# Patient Record
Sex: MALE | Race: BLACK OR AFRICAN AMERICAN | URBAN - METROPOLITAN AREA
[De-identification: names, ages, dates, MRNs, and addresses within clinical notes are randomized per-mention and may not be internally consistent; named-entity substitution may affect disease eponyms.]

---

## 2017-02-28 ENCOUNTER — OFFICE VISIT (OUTPATIENT)
Dept: INTERNAL MEDICINE CLINIC | Age: 37
End: 2017-02-28

## 2017-02-28 VITALS
HEIGHT: 74 IN | BODY MASS INDEX: 36.83 KG/M2 | OXYGEN SATURATION: 98 % | TEMPERATURE: 98 F | WEIGHT: 287 LBS | HEART RATE: 63 BPM | DIASTOLIC BLOOD PRESSURE: 83 MMHG | SYSTOLIC BLOOD PRESSURE: 133 MMHG

## 2017-02-28 DIAGNOSIS — L30.8 OTHER ECZEMA: ICD-10-CM

## 2017-02-28 DIAGNOSIS — J30.2 SEASONAL ALLERGIC RHINITIS, UNSPECIFIED ALLERGIC RHINITIS TRIGGER: ICD-10-CM

## 2017-02-28 DIAGNOSIS — Z00.00 ROUTINE GENERAL MEDICAL EXAMINATION AT A HEALTH CARE FACILITY: Primary | ICD-10-CM

## 2017-02-28 PROBLEM — J30.1 SEASONAL ALLERGIC RHINITIS DUE TO POLLEN: Status: ACTIVE | Noted: 2017-02-28

## 2017-02-28 PROBLEM — E66.01 MORBID OBESITY DUE TO EXCESS CALORIES (HCC): Status: ACTIVE | Noted: 2017-02-28

## 2017-02-28 RX ORDER — NYSTATIN 100000 [USP'U]/G
POWDER TOPICAL 4 TIMES DAILY
Qty: 30 G | Refills: 3 | Status: SHIPPED | OUTPATIENT
Start: 2017-02-28

## 2017-02-28 RX ORDER — FLUOCINONIDE 0.5 MG/G
CREAM TOPICAL 2 TIMES DAILY
Qty: 60 G | Refills: 0 | Status: SHIPPED | OUTPATIENT
Start: 2017-02-28

## 2017-02-28 RX ORDER — MONTELUKAST SODIUM 10 MG/1
10 TABLET ORAL DAILY
Qty: 30 TAB | Refills: 11 | Status: SHIPPED | OUTPATIENT
Start: 2017-02-28 | End: 2018-10-12 | Stop reason: SDUPTHER

## 2017-02-28 NOTE — MR AVS SNAPSHOT
Visit Information Date & Time Provider Department Dept. Phone Encounter #  
 2/28/2017 10:00 AM Sherry Duncan [facility] [address] [phone] [id number] Follow-up Instructions Return in about 1 year (around 2/28/2018) for cpe. Upcoming Health Maintenance Date Due DTaP/Tdap/Td series (1 - Tdap) 9/6/1999 INFLUENZA AGE 9 TO ADULT 8/1/2016 Allergies as of 2/28/2017  Review Complete On: 2/28/2017 By: Rosio Spence LPN Not on File Current Immunizations  Never Reviewed No immunizations on file. Not reviewed this visit You Were Diagnosed With   
  
 Codes Comments Routine general medical examination at a health care facility    -  Primary ICD-10-CM: Z00.00 ICD-9-CM: V70.0 Seasonal allergic rhinitis, unspecified allergic rhinitis trigger     ICD-10-CM: J30.2 ICD-9-CM: 477.9 Other eczema     ICD-10-CM: L30.8 Vitals BP  
  
  
  
  
  
 133/83 (BP 1 Location: Left arm, BP Patient Position: Sitting) BMI and BSA Data Body Mass Index Body Surface Area  
 37.35 kg/m 2 2.6 m 2 Your Updated Medication List  
  
   
This list is accurate as of: 2/28/17 10:50 AM.  Always use your most recent med list.  
  
  
  
  
 fluocinoNIDE 0.05 % topical cream  
Commonly known as:  LIDEX Apply  to affected area two (2) times a day. montelukast 10 mg tablet Commonly known as:  SINGULAIR Take 1 Tab by mouth daily. nystatin powder Commonly known as:  MYCOSTATIN Apply  to affected area four (4) times daily. Prescriptions Printed Refills  
 fluocinoNIDE (LIDEX) 0.05 % topical cream 0 Sig: Apply  to affected area two (2) times a day. Class: Print Route: Topical  
 nystatin (MYCOSTATIN) powder 3 Sig: Apply  to affected area four (4) times daily. Class: Print Route: Topical  
 montelukast (SINGULAIR) 10 mg tablet 11 Sig: Take 1 Tab by mouth daily. Class: Print Route: Oral  
  
We Performed the Following CBC W/O DIFF [43159 CPT(R)] LIPID PANEL [68217 CPT(R)] METABOLIC PANEL, COMPREHENSIVE [22565 CPT(R)] REFERRAL TO ALLERGY [REF5 Custom] Comments:  
 Please evaluate patient for allergic rhinitis/ asthma TSH 3RD GENERATION [20568 CPT(R)] URINALYSIS W/ RFLX MICROSCOPIC [32207 CPT(R)] Follow-up Instructions Return in about 1 year (around 2/28/2018) for cpe. Referral Information Referral ID Referred By Referred To [id number] MD Zee [address] [place], [facility] Phone: [phone] Fax: [phone] Visits Status Start Date End Date 1 New Request 2/28/17 2/28/18 If your referral has a status of pending review or denied, additional information will be sent to support the outcome of this decision. Introducing [facility] & HEALTH SERVICES! Navin Blank introduces ClinicIQ patient portal. Now you can access parts of your medical record, email your doctor's office, and request medication refills online. 1. In your internet browser, go to [email or web address] [email or web address] 2. Click on the First Time User? Click Here link in the Sign In box. You will see the New Member Sign Up page. 3. Enter your ClinicIQ Access Code exactly as it appears below. You will not need to use this code after youve completed the sign-up process. If you do not sign up before the expiration date, you must request a new code. · Clinic[employer] Access Code: [email or web address] Expires: 5/29/2017 10:50 AM 
 
4. Enter the last four digits of your Social Security Number (xxxx) and Date of Birth (mm/dd/yyyy) as indicated and click Submit. You will be taken to the next sign-up page. 5. Create a View and Chewt ID. This will be your ClinicIQ login ID and cannot be changed, so think of one that is secure and easy to remember. 6. Create a View [employer] Chewt password. You can change your password at any time. 7. Enter your Password Reset Question and Answer. This can be used at a later time if you forget your password. 8. Enter your e-mail address. You will receive e-mail notification when new information is available in [address] 9. Click Sign Up. You can now view and download portions of your medical record. 10. Click the Download Summary menu link to download a portable copy of your medical information. If you have questions, please visit the Frequently Asked Questions section of the Campus[employer] website. Remember, [employer] is NOT to be used for urgent needs. For medical emergencies, dial 911. Now available from your iPhone and Android! Please provide this summary of care documentation to your next provider. If you have any questions after today's visit, please call [phone].

## 2017-02-28 NOTE — PROGRESS NOTES
HISTORY OF PRESENT ILLNESS  Yola Wells is a 43 y.o. male. HPI   Pt here for CPE and to establish care  Has not seen PCP in years  Has been to [facility] for allergy flare ups and sometimes has had neb tx  Works in [address] [facility] [employer] for [employer] and has bad rhinitis during the spring, oral antihistamines not effecteive and does not like using nasal steroid sprays  Gained 20 lbs over the winter  Has recurring itchy groin rash--lotrimin spray provides temporary relief  Has chronic scaly rash on left buttocks and top of right foot which is dark      Patient Active Problem List    Diagnosis Date Noted    Seasonal allergic rhinitis due to pollen 02/28/2017     Current Outpatient Prescriptions   Medication Sig Dispense Refill    fluocinoNIDE (LIDEX) 0.05 % topical cream Apply  to affected area two (2) times a day. 60 g 0    nystatin (MYCOSTATIN) powder Apply  to affected area four (4) times daily. 30 g 3    montelukast (SINGULAIR) 10 mg tablet Take 1 Tab by mouth daily. 30 Tab 11     Not on File  No past medical history on file. No past surgical history on file. Family History   Problem Relation Age of Onset    Hypertension Mother     Stroke Father      Social History   Substance Use Topics    Smoking status: Not on file    Smokeless tobacco: Not on file    Alcohol use No      Comment: occ.       No results found for: HBA1C, [id number], HGBE8, GLU, GESTF, GLUCPOC, MCACR, MCA1, MCA2, MCA3, MCA4, UMCA, MCAU, LDL, DLDL, LDLC, DLDLP, KIRSTY, CREAPOC, MCREA, REFC7, ACREA, CREA, REFC3, REFC4, [id number]   No results found for: CHOL, CHOLX, CHLST, CHOLV, HDL, LDL, DLDL, LDLC, DLDLP, TGL, TGLX, TRIGL, [id number], TRIGP, CHHD, CHHDX    No results found for: CGFR, GFRAA, GFRNA, CRCLT, [id number], CCT, KIRSTY, CREAPOC, MCREA, REFC7, ACREA, CREA, REFC3, REFC4, BUN, BUNPOC, IBUN, MBUNV, BUNV, NAPOC, NA, PNA, WBNA, K, KPOCT, KI, PLK, WBK, CLPOC, PCL, CL, WBCL, CO2, DIG, DIGP, MDIG      Review of Systems   Constitutional: Negative for chills, fever, malaise/fatigue and weight loss. HENT: Positive for congestion. Allergy symptoms, runny nose , sneezes   Eyes: Negative for blurred vision and double vision. Respiratory: Negative for cough and shortness of breath. Cardiovascular: Negative for chest pain and palpitations. Gastrointestinal: Negative for abdominal pain, blood in stool, constipation, diarrhea, melena, nausea and vomiting. Genitourinary: Negative for dysuria, frequency, hematuria and urgency. Musculoskeletal: Negative for back pain, falls, joint pain and myalgias. Neurological: Negative for dizziness, tremors and headaches. Physical Exam   Constitutional: He appears well-developed and well-nourished. No distress. Appears stated age   HENT:   Head: Normocephalic. Nose: Nose normal.   Eyes: Pupils are equal, round, and reactive to light. Neck: No JVD present. No tracheal deviation present. No thyromegaly present. Cardiovascular: Normal rate, regular rhythm, normal heart sounds and intact distal pulses. Exam reveals no gallop and no friction rub. No murmur heard. Pulmonary/Chest: Effort normal and breath sounds normal. No respiratory distress. He has no wheezes. He has no rales. He exhibits no tenderness. Abdominal: Soft. He exhibits no mass. There is no tenderness. There is no rebound and no guarding. Musculoskeletal: He exhibits no edema. Lymphadenopathy:     He has no cervical adenopathy. Neurological: He is alert. Skin: Skin is warm. Scaly rash left buttocks and dark rash right foot. Faint rash left groin   Psychiatric: He has a normal mood and affect. Nursing note and vitals reviewed. ASSESSMENT and PLAN  Tony Jara was seen today for establish care and rash.     Diagnoses and all orders for this visit:    Routine general medical examination at a health care facility  -     LIPID PANEL  -     CBC W/O DIFF  -     URINALYSIS W/ RFLX MICROSCOPIC  -     TSH 3RD GENERATION  - METABOLIC PANEL, COMPREHENSIVE    Seasonal allergic rhinitis, unspecified allergic rhinitis trigger  -     REFERRAL TO ALLERGY   Trial of singulair   Declines steroid nasal spray    Other eczema   Lidex crm to buttock and right foot    Groin rash   Nystatin powder bid prn  Other orders  -     fluocinoNIDE (LIDEX) 0.05 % topical cream; Apply  to affected area two (2) times a day. -     nystatin (MYCOSTATIN) powder; Apply  to affected area four (4) times daily. -     montelukast (SINGULAIR) 10 mg tablet; Take 1 Tab by mouth daily. Follow-up Disposition:  Return in about 1 year (around 2/28/2018) for cpe.

## 2017-03-01 LAB
ALBUMIN SERPL-MCNC: 4.5 G/DL (ref 3.5–5.5)
ALBUMIN/GLOB SERPL: 1.4 {RATIO} (ref 1.1–2.5)
ALP SERPL-CCNC: 99 IU/L (ref 39–117)
ALT SERPL-CCNC: 30 IU/L (ref 0–44)
APPEARANCE UR: CLEAR
AST SERPL-CCNC: 26 IU/L (ref 0–40)
BILIRUB SERPL-MCNC: 0.7 MG/DL (ref 0–1.2)
BILIRUB UR QL STRIP: NEGATIVE
BUN SERPL-MCNC: 10 MG/DL (ref 6–20)
BUN/CREAT SERPL: 10 (ref 8–19)
CALCIUM SERPL-MCNC: 9.7 MG/DL (ref 8.7–10.2)
CHLORIDE SERPL-SCNC: 100 MMOL/L (ref 96–106)
CHOLEST SERPL-MCNC: 175 MG/DL (ref 100–199)
CO2 SERPL-SCNC: 26 MMOL/L (ref 18–29)
COLOR UR: YELLOW
CREAT SERPL-MCNC: 1 MG/DL (ref 0.76–1.27)
ERYTHROCYTE [DISTWIDTH] IN BLOOD BY AUTOMATED COUNT: 14.6 % (ref 12.3–15.4)
GLOBULIN SER CALC-MCNC: 3.3 G/DL (ref 1.5–4.5)
GLUCOSE SERPL-MCNC: 88 MG/DL (ref 65–99)
GLUCOSE UR QL: NEGATIVE
HCT VFR BLD AUTO: 46.7 % (ref 37.5–51)
HDLC SERPL-MCNC: 50 MG/DL
HGB BLD-MCNC: 15.9 G/DL (ref 12.6–17.7)
HGB UR QL STRIP: NEGATIVE
KETONES UR QL STRIP: NEGATIVE
LDLC SERPL CALC-MCNC: 103 MG/DL (ref 0–99)
LEUKOCYTE ESTERASE UR QL STRIP: NEGATIVE
MCH RBC QN AUTO: 27.7 PG (ref 26.6–33)
MCHC RBC AUTO-ENTMCNC: 34 G/DL (ref 31.5–35.7)
MCV RBC AUTO: 81 FL (ref 79–97)
MICRO URNS: NORMAL
NITRITE UR QL STRIP: NEGATIVE
PH UR STRIP: 7.5 [PH] (ref 5–7.5)
PLATELET # BLD AUTO: 335 X10E3/UL (ref 150–379)
POTASSIUM SERPL-SCNC: 4.7 MMOL/L (ref 3.5–5.2)
PROT SERPL-MCNC: 7.8 G/DL (ref 6–8.5)
PROT UR QL STRIP: NORMAL
RBC # BLD AUTO: 5.74 X10E6/UL (ref 4.14–5.8)
SODIUM SERPL-SCNC: 141 MMOL/L (ref 134–144)
SP GR UR: 1.02 (ref 1–1.03)
TRIGL SERPL-MCNC: 112 MG/DL (ref 0–149)
TSH SERPL DL<=0.005 MIU/L-ACNC: 1.03 UIU/ML (ref 0.45–4.5)
UROBILINOGEN UR STRIP-MCNC: 1 MG/DL (ref 0.2–1)
VLDLC SERPL CALC-MCNC: 22 MG/DL (ref 5–40)
WBC # BLD AUTO: 8 X10E3/UL (ref 3.4–10.8)

## 2017-06-27 ENCOUNTER — TELEPHONE (OUTPATIENT)
Dept: INTERNAL MEDICINE CLINIC | Age: 37
End: 2017-06-27

## 2017-06-27 NOTE — TELEPHONE ENCOUNTER
Call completed to Jemma Dhillon, there was no answer. Left a voice mail providing requested information for [facility]. Requested a return call if further assistance is needed.

## 2017-06-27 NOTE — TELEPHONE ENCOUNTER
Keyona Rodriguez is requesting the name and phone to a dermatologist the Dr Joshua Fierro recommended. Ms Chasity Calixto phone number is [phone].        Message received & copied from [facility]

## 2017-07-31 ENCOUNTER — OFFICE VISIT (OUTPATIENT)
Dept: INTERNAL MEDICINE CLINIC | Age: 37
End: 2017-07-31

## 2017-07-31 VITALS
HEART RATE: 63 BPM | TEMPERATURE: 97.6 F | HEIGHT: 73 IN | WEIGHT: 282.2 LBS | SYSTOLIC BLOOD PRESSURE: 116 MMHG | BODY MASS INDEX: 37.4 KG/M2 | DIASTOLIC BLOOD PRESSURE: 70 MMHG | OXYGEN SATURATION: 96 %

## 2017-07-31 DIAGNOSIS — M72.2 PLANTAR FASCIITIS, BILATERAL: Primary | ICD-10-CM

## 2017-07-31 NOTE — PROGRESS NOTES
HISTORY OF PRESENT ILLNESS  Sangeetha Griffin is a 43 y.o. male. HPI  C/o pain in bootom of feet and arches to heel for months  Wears steel toes boot but needs a note to employer so that they will cover the cost of new work boot with more arch support    Patient Active Problem List    Diagnosis Date Noted    Seasonal allergic rhinitis due to pollen 02/28/2017    Morbid obesity due to excess calories ([address] 02/28/2017     Current Outpatient Prescriptions   Medication Sig Dispense Refill    montelukast (SINGULAIR) 10 mg tablet Take 1 Tab by mouth daily. 30 Tab 11    fluocinoNIDE (LIDEX) 0.05 % topical cream Apply  to affected area two (2) times a day. 60 g 0    nystatin (MYCOSTATIN) powder Apply  to affected area four (4) times daily. 30 g 3     Not on File   Lab Results  Component Value Date/Time   Glucose 88 02/28/2017 11:24 AM   LDL, calculated 103 02/28/2017 11:24 AM   Creatinine 1.00 02/28/2017 11:24 AM      Lab Results  Component Value Date/Time   Cholesterol, total 175 02/28/2017 11:24 AM   HDL Cholesterol 50 02/28/2017 11:24 AM   LDL, calculated 103 02/28/2017 11:24 AM   Triglyceride 112 02/28/2017 11:24 AM     Lab Results  Component Value Date/Time   GFR est non-AA 95 02/28/2017 11:24 AM   GFR est  02/28/2017 11:24 AM   Creatinine 1.00 02/28/2017 11:24 AM   BUN 10 02/28/2017 11:24 AM   Sodium 141 02/28/2017 11:24 AM   Potassium 4.7 02/28/2017 11:24 AM   Chloride 100 02/28/2017 11:24 AM   CO2 26 02/28/2017 11:24 AM          ROS    Physical Exam   Constitutional: He appears well-developed and well-nourished. No distress. Appears stated age   HENT:   Head: Normocephalic. Cardiovascular: Normal rate, regular rhythm and normal heart sounds. Exam reveals no gallop and no friction rub. No murmur heard. Pulmonary/Chest: Effort normal and breath sounds normal.   Abdominal: Soft. Musculoskeletal: He exhibits no edema. TTP b/l plantar fascia area   Neurological: He is alert.    Psychiatric: He has a normal mood and affect. Nursing note and vitals reviewed. ASSESSMENT and PLAN  Diagnoses and all orders for this visit:    1.  Plantar fasciitis, bilateral   Rest ,ice , stretching exercises dicussed   Letter written for pt to get work shoes/boot with extra foot support    Follow-up Disposition: Not on File

## 2017-07-31 NOTE — PROGRESS NOTES
Patient is here today for acute foot pain. Patient is complaining  Of both feet. Patient will also need a work excuse for today's   visit.

## 2017-07-31 NOTE — MR AVS SNAPSHOT
Visit Information Date & Time Provider Department Dept. Phone Encounter #  
 7/31/2017 10:30 AM Deedee Garg [address] [place] [address],[address] [phone] [id number] Upcoming Health Maintenance Date Due INFLUENZA AGE 9 TO ADULT 8/1/2017 DTaP/Tdap/Td series (2 - Td) 1/3/2023 Allergies as of 7/31/2017  Review Complete On: 7/31/2017 By: Batsheva Canada LPN Not on File Current Immunizations  Never Reviewed No immunizations on file. Not reviewed this visit You Were Diagnosed With   
  
 Codes Comments Plantar fasciitis, bilateral    -  Primary ICD-10-CM: M72.2 ICD-9-CM: 728.71 Vitals BP Pulse Temp Height(growth percentile) Weight(growth percentile) SpO2  
 116/70 (BP 1 Location: Left arm, BP Patient Position: Sitting) 63 97.6 °F (36.4 °C) (Oral) 6' 1\" (1.854 m) 282 lb 3.2 oz (128 kg) 96% BMI Smoking Status 37.23 kg/m2 Never Smoker BMI and BSA Data Body Mass Index Body Surface Area  
 37.23 kg/m 2 2.57 m 2 Your Updated Medication List  
  
   
This list is accurate as of: 7/31/17 11:33 AM.  Always use your most recent med list.  
  
  
  
  
 fluocinoNIDE 0.05 % topical cream  
Commonly known as:  Yue Hernandez Apply  to affected area two (2) times a day. montelukast 10 mg tablet Commonly known as:  SINGULAIR Take 1 Tab by mouth daily. nystatin powder Commonly known as:  MYCOSTATIN Apply  to affected area four (4) times daily. Introducing [facility] & HEALTH SERVICES! formerly [facility] introduces For[employer] patient portal. Now you can access parts of your medical record, email your doctor's office, and request medication refills online. 1. In your internet browser, go to [email or web address]://[email or web address] Home. [employer]/F[employer] For Home 2. Click on the First Time User? Click Here link in the Sign In box. You will see the New Member Sign Up page. 3. Enter your Forter Access Code exactly as it appears below.  You will not need to use this code after youve completed the sign-up process. If you do not sign up before the expiration date, you must request a new code. · [employer] Access Code: [email or web address] Expires: 10/29/2017 11:33 AM 
 
4. Enter the last four digits of your Social Security Number (xxxx) and Date of Birth (mm/dd/yyyy) as indicated and click Submit. You will be taken to the next sign-up page. 5. Create a [employer] ID. This will be your [employer] login ID and cannot be changed, so think of one that is secure and easy to remember. 6. Create a [employer] password. You can change your password at any time. 7. Enter your Password Reset Question and Answer. This can be used at a later time if you forget your password. 8. Enter your e-mail address. You will receive e-mail notification when new information is available in [address] 9. Click Sign Up. You can now view and download portions of your medical record. 10. Click the Download Summary menu link to download a portable copy of your medical information. If you have questions, please visit the Frequently Asked Questions section of the [employer] website. Remember, [employer] is NOT to be used for urgent needs. For medical emergencies, dial 911. Now available from your iPhone and Android! Please provide this summary of care documentation to your next provider. If you have any questions after today's visit, please call [phone].

## 2017-07-31 NOTE — LETTER
7/31/2017 11:32 AM 
 
Mr. Olga Lidia [phone] Mr Gato Crawford is having difficulty with bilateral foot pain related to plantar fascitis. He should have shoes or boots with extra support.  
 
 
 
 
Sincerely, 
 
 
Jennifer Candelaria MD

## 2018-10-12 ENCOUNTER — OFFICE VISIT (OUTPATIENT)
Dept: INTERNAL MEDICINE CLINIC | Age: 38
End: 2018-10-12

## 2018-10-12 VITALS
OXYGEN SATURATION: 99 % | TEMPERATURE: 98.1 F | HEIGHT: 73 IN | DIASTOLIC BLOOD PRESSURE: 76 MMHG | HEART RATE: 57 BPM | SYSTOLIC BLOOD PRESSURE: 126 MMHG | WEIGHT: 284 LBS | BODY MASS INDEX: 37.64 KG/M2

## 2018-10-12 DIAGNOSIS — Z00.00 ROUTINE GENERAL MEDICAL EXAMINATION AT A HEALTH CARE FACILITY: Primary | ICD-10-CM

## 2018-10-12 DIAGNOSIS — J30.1 SEASONAL ALLERGIC RHINITIS DUE TO POLLEN: ICD-10-CM

## 2018-10-12 RX ORDER — MONTELUKAST SODIUM 10 MG/1
10 TABLET ORAL DAILY
Qty: 90 TAB | Refills: 3 | Status: SHIPPED | OUTPATIENT
Start: 2018-10-12

## 2018-10-12 NOTE — PROGRESS NOTES
Chief Complaint Patient presents with  Complete Physical  
  yearly  Medication Refill Singular (written rx please)

## 2018-10-12 NOTE — MR AVS SNAPSHOT
[address] [facility] [address] [facility] [address] 
[phone] Patient: Marquise Villalba MRN: [id number] LUE:0/3/9276 Visit Information Date & Time Provider Department Dept. Phone Encounter #  
 10/12/2018  9:00 AM Claire Ervin, [address] [place] [address],[address] [phone] [id number] Follow-up Instructions Return in about 1 year (around 10/12/2019) for cpe. Upcoming Health Maintenance Date Due Influenza Age 5 to Adult 3/31/2019* DTaP/Tdap/Td series (2 - Td) 1/3/2023 *Topic was postponed. The date shown is not the original due date. Allergies as of 10/12/2018  Review Complete On: 10/12/2018 By: Alejandra James LPN Not on File Current Immunizations  Never Reviewed No immunizations on file. Not reviewed this visit You Were Diagnosed With   
  
 Codes Comments Routine general medical examination at a health care facility    -  Primary ICD-10-CM: Z00.00 ICD-9-CM: V70.0 Seasonal allergic rhinitis due to pollen     ICD-10-CM: J30.1 ICD-9-CM: 477.0 Vitals BP Pulse Temp Height(growth percentile) Weight(growth percentile) SpO2  
 126/76 (BP 1 Location: Left arm, BP Patient Position: Sitting) (!) 57 98.1 °F (36.7 °C) (Oral) 6' 1\" (1.854 m) 284 lb (128.8 kg) 99% BMI Smoking Status 37.47 kg/m2 Never Smoker BMI and BSA Data Body Mass Index Body Surface Area  
 37.47 kg/m 2 2.58 m 2 Your Updated Medication List  
  
   
This list is accurate as of 10/12/18  9:57 AM.  Always use your most recent med list.  
  
  
  
  
 fluocinoNIDE 0.05 % topical cream  
Commonly known as:  LIDEX Apply  to affected area two (2) times a day. montelukast 10 mg tablet Commonly known as:  SINGULAIR Take 1 Tab by mouth daily. nystatin powder Commonly known as:  MYCOSTATIN Apply  to affected area four (4) times daily. Prescriptions Printed Refills  
 montelukast (SINGULAIR) 10 mg tablet 3 Sig: Take 1 Tab by mouth daily. Class: Print Route: Oral  
  
We Performed the Following CBC W/O DIFF [08152 CPT(R)] LIPID PANEL [86473 CPT(R)] METABOLIC PANEL, COMPREHENSIVE [03115 CPT(R)] TSH 3RD GENERATION [47479 CPT(R)] Follow-up Instructions Return in about 1 year (around 10/12/2019) for cpe. Introducing [facility] & HEALTH SERVICES! [employer] introduces [employer] patient portal. Now you can access parts of your medical record, email your doctor's office, and request medication refills online. 1. In your internet browser, go to [email or web address] [employer] 2. Click on the First Time User? Click Here link in the Sign In box. You will see the New Member Sign Up page. 3. Enter your [employer] Access Code exactly as it appears below. You will not need to use this code after youve completed the sign-up process. If you do not sign up before the expiration date, you must request a new code. · [employer] Access Code: [email or web address] Expires: 1/10/2019  9:57 AM 
 
4. Enter the last four digits of your Social Security Number (xxxx) and Date of Birth (mm/dd/yyyy) as indicated and click Submit. You will be taken to the next sign-up page. 5. Create a [employer] ID. This will be your [employer] login ID and cannot be changed, so think of one that is secure and easy to remember. 6. Create a [employer] password. You can change your password at any time. 7. Enter your Password Reset Question and Answer. This can be used at a later time if you forget your password. 8. Enter your e-mail address. You will receive e-mail notification when new information is available in [address] 9. Click Sign Up. You can now view and download portions of your medical record. 10. Click the Download Summary menu link to download a portable copy of your medical information.  
 
If you have questions, please visit the Frequently Asked Questions section of the [employer]. Remember, [employer] LLChart is NOT to be used for urgent needs. For medical emergencies, dial 911. Now available from your iPhone and Android! Please provide this summary of care documentation to your next provider. Your primary care clinician is listed as Rafat GRANT. If you have any questions after today's visit, please call [phone].

## 2018-10-12 NOTE — PROGRESS NOTES
HISTORY OF PRESENT ILLNESS David Brown is a 34 y.o. male. HPI Here for CPE Works in [employer] Not on a strict diet Weight has been stable Feels well overall Requests a refill of singulair for allergic rhinitis Patient Active Problem List  
 Diagnosis Date Noted  Seasonal allergic rhinitis due to pollen 02/28/2017  Morbid obesity due to excess calories ([address] 02/28/2017 Current Outpatient Prescriptions Medication Sig Dispense Refill  fluocinoNIDE (LIDEX) 0.05 % topical cream Apply  to affected area two (2) times a day. 60 g 0  
 nystatin (MYCOSTATIN) powder Apply  to affected area four (4) times daily. 30 g 3  
 montelukast (SINGULAIR) 10 mg tablet Take 1 Tab by mouth daily. 30 Tab 11 Not on File Lab Results Component Value Date/Time Glucose 88 02/28/2017 11:24 AM  
LDL, calculated 103 (H) 02/28/2017 11:24 AM  
Creatinine 1.00 02/28/2017 11:24 AM  
  
Lab Results Component Value Date/Time Cholesterol, total 175 02/28/2017 11:24 AM  
HDL Cholesterol 50 02/28/2017 11:24 AM  
LDL, calculated 103 (H) 02/28/2017 11:24 AM  
Triglyceride 112 02/28/2017 11:24 AM  
 
Lab Results Component Value Date/Time GFR est non-AA 95 02/28/2017 11:24 AM  
GFR est  02/28/2017 11:24 AM  
Creatinine 1.00 02/28/2017 11:24 AM  
BUN 10 02/28/2017 11:24 AM  
Sodium 141 02/28/2017 11:24 AM  
Potassium 4.7 02/28/2017 11:24 AM  
Chloride 100 02/28/2017 11:24 AM  
CO2 26 02/28/2017 11:24 AM  
  
 
ROS Physical Exam  
Constitutional: He appears well-developed and well-nourished. No distress. Appears stated age, obese, nad HENT:  
Head: Normocephalic. Mouth/Throat: Oropharynx is clear and moist.  
Eyes: Pupils are equal, round, and reactive to light. Neck: Normal range of motion. Neck supple. No JVD present. No tracheal deviation present. No thyromegaly present.   
Cardiovascular: Normal rate, regular rhythm, normal heart sounds and intact distal pulses. Exam reveals no gallop and no friction rub. No murmur heard. Pulmonary/Chest: Effort normal and breath sounds normal. No respiratory distress. He has no wheezes. He has no rales. He exhibits no tenderness. Abdominal: Soft. He exhibits no distension and no mass. There is no tenderness. There is no rebound and no guarding. Musculoskeletal: He exhibits no edema. Lymphadenopathy:  
  He has no cervical adenopathy. Neurological: He is alert. Skin: Skin is warm. Psychiatric: He has a normal mood and affect. Nursing note and vitals reviewed. ASSESSMENT and PLAN Diagnoses and all orders for this visit: 1. Routine general medical examination at a health care facility 
-     CBC W/O DIFF 
-     METABOLIC PANEL, COMPREHENSIVE 
-     LIPID PANEL 
-     TSH 3RD GENERATION Low calorie diet recommended-carb counting discussion and handout 2. Seasonal allergic rhinitis due to pollen Refilled singulair Other orders 
-     montelukast (SINGULAIR) 10 mg tablet; Take 1 Tab by mouth daily. Follow-up Disposition: 
Return in about 1 year (around 10/12/2019) for cpe.

## 2018-10-14 LAB
ALBUMIN SERPL-MCNC: 4.6 G/DL (ref 3.5–5.5)
ALBUMIN/GLOB SERPL: 1.7 {RATIO} (ref 1.2–2.2)
ALP SERPL-CCNC: 98 IU/L (ref 39–117)
ALT SERPL-CCNC: 33 IU/L (ref 0–44)
AST SERPL-CCNC: 29 IU/L (ref 0–40)
BILIRUB SERPL-MCNC: 0.3 MG/DL (ref 0–1.2)
BUN SERPL-MCNC: 9 MG/DL (ref 6–24)
BUN/CREAT SERPL: 9 (ref 9–20)
CALCIUM SERPL-MCNC: 9.4 MG/DL (ref 8.7–10.2)
CHLORIDE SERPL-SCNC: 103 MMOL/L (ref 96–106)
CHOLEST SERPL-MCNC: 157 MG/DL (ref 100–199)
CO2 SERPL-SCNC: 25 MMOL/L (ref 20–29)
CREAT SERPL-MCNC: 1 MG/DL (ref 0.76–1.27)
ERYTHROCYTE [DISTWIDTH] IN BLOOD BY AUTOMATED COUNT: 15 % (ref 12.3–15.4)
GLOBULIN SER CALC-MCNC: 2.7 G/DL (ref 1.5–4.5)
GLUCOSE SERPL-MCNC: 83 MG/DL (ref 65–99)
HCT VFR BLD AUTO: 47.7 % (ref 37.5–51)
HDLC SERPL-MCNC: 43 MG/DL
HGB BLD-MCNC: 15.6 G/DL (ref 13–17.7)
LDLC SERPL CALC-MCNC: 92 MG/DL (ref 0–99)
MCH RBC QN AUTO: 28.1 PG (ref 26.6–33)
MCHC RBC AUTO-ENTMCNC: 32.7 G/DL (ref 31.5–35.7)
MCV RBC AUTO: 86 FL (ref 79–97)
PLATELET # BLD AUTO: 296 X10E3/UL (ref 150–379)
POTASSIUM SERPL-SCNC: 4.5 MMOL/L (ref 3.5–5.2)
PROT SERPL-MCNC: 7.3 G/DL (ref 6–8.5)
RBC # BLD AUTO: 5.56 X10E6/UL (ref 4.14–5.8)
SODIUM SERPL-SCNC: 144 MMOL/L (ref 134–144)
TRIGL SERPL-MCNC: 109 MG/DL (ref 0–149)
TSH SERPL DL<=0.005 MIU/L-ACNC: 1.46 UIU/ML (ref 0.45–4.5)
VLDLC SERPL CALC-MCNC: 22 MG/DL (ref 5–40)
WBC # BLD AUTO: 7.5 X10E3/UL (ref 3.4–10.8)

## 2019-03-22 ENCOUNTER — OFFICE VISIT (OUTPATIENT)
Dept: INTERNAL MEDICINE CLINIC | Age: 39
End: 2019-03-22

## 2019-03-22 VITALS
OXYGEN SATURATION: 98 % | DIASTOLIC BLOOD PRESSURE: 85 MMHG | SYSTOLIC BLOOD PRESSURE: 130 MMHG | BODY MASS INDEX: 38.3 KG/M2 | TEMPERATURE: 98.2 F | HEIGHT: 73 IN | HEART RATE: 65 BPM | RESPIRATION RATE: 14 BRPM | WEIGHT: 289 LBS

## 2019-03-22 DIAGNOSIS — J30.1 ALLERGIC RHINITIS DUE TO POLLEN, UNSPECIFIED SEASONALITY: Primary | ICD-10-CM

## 2019-03-22 DIAGNOSIS — Z20.2 TRICHOMONAS CONTACT: ICD-10-CM

## 2019-03-22 RX ORDER — METRONIDAZOLE 500 MG/1
500 TABLET ORAL 2 TIMES DAILY
Qty: 14 TAB | Refills: 0 | Status: SHIPPED | OUTPATIENT
Start: 2019-03-22 | End: 2019-11-15 | Stop reason: ALTCHOICE

## 2019-03-22 RX ORDER — ALBUTEROL SULFATE 90 UG/1
1 AEROSOL, METERED RESPIRATORY (INHALATION)
Qty: 1 INHALER | Refills: 1 | Status: SHIPPED | OUTPATIENT
Start: 2019-03-22

## 2019-03-22 RX ORDER — FLUTICASONE PROPIONATE 50 MCG
2 SPRAY, SUSPENSION (ML) NASAL DAILY
Qty: 1 BOTTLE | Refills: 11 | Status: SHIPPED | OUTPATIENT
Start: 2019-03-22 | End: 2019-04-21

## 2019-03-22 RX ORDER — CETIRIZINE HCL 10 MG
10 TABLET ORAL DAILY
Qty: 30 TAB | Refills: 11 | Status: SHIPPED | OUTPATIENT
Start: 2019-03-22

## 2019-03-22 NOTE — PROGRESS NOTES
Chief Complaint Patient presents with  Allergies 2-3 weeks worse in summer months  Allergic Rhinitis 2-3 weeks  Medication Evaluation  
  discuss medication for allergies

## 2019-03-22 NOTE — PROGRESS NOTES
HISTORY OF PRESENT ILLNESS Mela Serrano is a 34 y.o. male. HPI Here in f/u allergic rhinitis, mild asthma 
symptoms worse in the spring Requests med refills Had trichomonas exposure-female partner was dx and treated last week Denies any penile lesions or discharge Last OV Here for CPE Works in [employer] Not on a strict diet Weight has been stable Feels well overall Requests a refill of singulair for allergic rhinitis 
  
    
Patient Active Problem List  
    
 
 
 
 
Patient Active Problem List  
 Diagnosis Date Noted  Seasonal allergic rhinitis due to pollen 02/28/2017  Morbid obesity due to excess calories ([address] 02/28/2017 Current Outpatient Medications Medication Sig Dispense Refill  montelukast (SINGULAIR) 10 mg tablet Take 1 Tab by mouth daily. 90 Tab 3  
 fluocinoNIDE (LIDEX) 0.05 % topical cream Apply  to affected area two (2) times a day. 60 g 0  
 nystatin (MYCOSTATIN) powder Apply  to affected area four (4) times daily. 30 g 3 Not on File Lab Results Component Value Date/Time GFR est non-AA 94 10/12/2018 10:03 AM  
 GFR est  10/12/2018 10:03 AM  
 Creatinine 1.00 10/12/2018 10:03 AM  
 BUN 9 10/12/2018 10:03 AM  
 Sodium 144 10/12/2018 10:03 AM  
 Potassium 4.5 10/12/2018 10:03 AM  
 Chloride 103 10/12/2018 10:03 AM  
 CO2 25 10/12/2018 10:03 AM  
  
ROS Physical Exam  
Constitutional: He appears well-developed and well-nourished. No distress. Appears stated age, obese HENT:  
Head: Normocephalic. Cardiovascular: Normal rate, regular rhythm and normal heart sounds. Exam reveals no gallop and no friction rub. No murmur heard. Pulmonary/Chest: Effort normal and breath sounds normal.  
Abdominal: Soft. Musculoskeletal: He exhibits no edema. Neurological: He is alert. Psychiatric: He has a normal mood and affect. Nursing note and vitals reviewed. ASSESSMENT and PLAN Diagnoses and all orders for this visit: 
 
 1. Allergic rhinitis due to pollen, unspecified seasonality 
-     cetirizine (ZYRTEC) 10 mg tablet; Take 1 Tab by mouth daily. -     fluticasone propionate (FLONASE) 50 mcg/actuation nasal spray; 2 Sprays by Both Nostrils route daily for 30 days. Albuterol refilled-very infrequent symptoms 2. Trichomonas contact Flagyl x 7d Safe sex recommended Other orders 
-     metroNIDAZOLE (FLAGYL) 500 mg tablet; Take 1 Tab by mouth two (2) times a day. -     albuterol (PROVENTIL HFA, VENTOLIN HFA, PROAIR HFA) 90 mcg/actuation inhaler; Take 1 Puff by inhalation every four (4) hours as needed for Wheezing. Follow-up and Dispositions · Return in about 6 months (around 9/22/2019) for CPE x 30 minutes.

## 2019-11-15 ENCOUNTER — OFFICE VISIT (OUTPATIENT)
Dept: INTERNAL MEDICINE CLINIC | Age: 39
End: 2019-11-15

## 2019-11-15 VITALS
HEIGHT: 73 IN | RESPIRATION RATE: 16 BRPM | WEIGHT: 294 LBS | SYSTOLIC BLOOD PRESSURE: 133 MMHG | DIASTOLIC BLOOD PRESSURE: 74 MMHG | BODY MASS INDEX: 38.97 KG/M2 | HEART RATE: 71 BPM | OXYGEN SATURATION: 97 % | TEMPERATURE: 97.6 F

## 2019-11-15 DIAGNOSIS — Z00.00 ROUTINE GENERAL MEDICAL EXAMINATION AT A HEALTH CARE FACILITY: Primary | ICD-10-CM

## 2019-11-15 NOTE — PROGRESS NOTES
Chief Complaint   Patient presents with   [facility] Complete Physical     Annual    Labs     Fasting

## 2019-11-15 NOTE — PROGRESS NOTES
HISTORY OF PRESENT ILLNESS  Mirtha Yao is a 37 y.o. male. HPI   Here for CPE  Works for [employer] a lot  Weight up several lbs--just stopped drinking sodas  Has not has allergy or asthma symptoms since fall weather arrived  Last OV  Here in f/u allergic rhinitis, mild asthma  symptoms worse in the spring  Requests med refills  Had trichomonas exposure-female partner was dx and treated last week  Denies any penile lesions or discharge    Patient Active Problem List   Diagnosis Code    Seasonal allergic rhinitis due to pollen J30.1    Morbid obesity due to excess calories ([facility]) E66.01     Current Outpatient Medications   Medication Sig Dispense Refill    cetirizine (ZYRTEC) 10 mg tablet Take 1 Tab by mouth daily. 30 Tab 11    albuterol (PROVENTIL HFA, VENTOLIN HFA, PROAIR HFA) 90 mcg/actuation inhaler Take 1 Puff by inhalation every four (4) hours as needed for Wheezing. 1 Inhaler 1    montelukast (SINGULAIR) 10 mg tablet Take 1 Tab by mouth daily. 90 Tab 3    fluocinoNIDE (LIDEX) 0.05 % topical cream Apply  to affected area two (2) times a day. 60 g 0    nystatin (MYCOSTATIN) powder Apply  to affected area four (4) times daily. 30 g 3     Not on File  Social History     Tobacco Use    Smoking status: Never Smoker    Smokeless tobacco: Never Used   Substance Use Topics    Alcohol use: No     Comment: occ.       Lab Results   Component Value Date/Time    WBC 7.5 10/12/2018 10:03 AM    HGB 15.6 10/12/2018 10:03 AM    HCT 47.7 10/12/2018 10:03 AM    PLATELET 298 97/07/1115 10:03 AM    MCV 86 10/12/2018 10:03 AM     Lab Results   Component Value Date/Time    Glucose 83 10/12/2018 10:03 AM    LDL, calculated 92 10/12/2018 10:03 AM    Creatinine 1.00 10/12/2018 10:03 AM      Lab Results   Component Value Date/Time    Cholesterol, total 157 10/12/2018 10:03 AM    HDL Cholesterol 43 10/12/2018 10:03 AM    LDL, calculated 92 10/12/2018 10:03 AM    Triglyceride 109 10/12/2018 10:03 AM Lab Results   Component Value Date/Time    GFR est non-AA 94 10/12/2018 10:03 AM    GFR est  10/12/2018 10:03 AM    Creatinine 1.00 10/12/2018 10:03 AM    BUN 9 10/12/2018 10:03 AM    Sodium 144 10/12/2018 10:03 AM    Potassium 4.5 10/12/2018 10:03 AM    Chloride 103 10/12/2018 10:03 AM    CO2 25 10/12/2018 10:03 AM        Review of Systems   Constitutional: Negative for chills, fever, malaise/fatigue and weight loss. Eyes: Negative for blurred vision and double vision. Respiratory: Negative for cough and shortness of breath. Cardiovascular: Negative for chest pain and palpitations. Gastrointestinal: Negative for abdominal pain, blood in stool, constipation, diarrhea, melena, nausea and vomiting. Genitourinary: Negative for dysuria, frequency, hematuria and urgency. Musculoskeletal: Negative for back pain, falls, joint pain and myalgias. Neurological: Negative for dizziness, tremors and headaches. Physical Exam   Constitutional: He appears well-developed and well-nourished. No distress. Appears stated age, obese. nad   HENT:   Head: Normocephalic. Mouth/Throat: Oropharynx is clear and moist.   Eyes: Pupils are equal, round, and reactive to light. Neck: Normal range of motion. Neck supple. No JVD present. No thyromegaly present. Cardiovascular: Normal rate, regular rhythm, normal heart sounds and intact distal pulses. Exam reveals no friction rub. No murmur heard. Pulmonary/Chest: Effort normal and breath sounds normal. No respiratory distress. He has no wheezes. He has no rales. He exhibits no tenderness. Abdominal: Soft. Bowel sounds are normal. He exhibits no distension and no mass. There is no tenderness. There is no rebound. Musculoskeletal: He exhibits no edema. Lymphadenopathy:     He has no cervical adenopathy. Neurological: He is alert. No cranial nerve deficit. Skin: Skin is warm. Psychiatric: He has a normal mood and affect.  His behavior is normal. Judgment and thought content normal.   Nursing note and vitals reviewed. ASSESSMENT and PLAN  Diagnoses and all orders for this visit:    1. Routine general medical examination at a health care facility  -     CBC W/O DIFF  -     METABOLIC PANEL, COMPREHENSIVE  -     LIPID PANEL  -     TSH 3RD GENERATION   Declines flu shot   Weight reduction needed-discussed  2. Obesity   I have reviewed/discussed the above normal BMI with the patient. I have recommended the following interventions: dietary management education, guidance, and counseling and encourage exercise . Arnulfo Monsalve 3. Asthma   Prn albuterol    4. Allergic rhinitis   Oral antihistamines and singulair prn  Follow-up and Dispositions    · Return in about 1 year (around 11/15/2020) for cpe.

## 2019-11-15 NOTE — PATIENT INSTRUCTIONS
Office Policies    Phone calls/patient messages:            Please allow up to 24 hours for someone in the office to contact you about your call or message. Be mindful your provider may be out of the office or your message may require further review. We encourage you to use [employer] for your messages as this is a faster, more efficient way to communicate with our office                         Medication Refills:            Prescription medications require 48-72 business hours to process. We encourage you to use [employer] for your refills. For controlled medications: Please allow 72 business hours to process. Certain medications may require you to  a written prescription at our office. NO narcotic/controlled medications will be prescribed after 4pm Monday through Friday or on weekends              Form/Paperwork Completion:            Please note a $25 fee may incur for all paperwork for completed by our providers. We ask that you allow 7-10 business days. Pre-payment is due prior to picking up/faxing the completed form. You may also download your forms to [employer] to have your doctor print off.

## 2019-11-15 NOTE — LETTER
NOTIFICATION RETURN TO WORK / SCHOOL 
 
11/15/2019 12:08 PM 
 
Mr. [address]in[phone] To Whom It May Concern: 
 
Nayeli Todd is currently under the care of [facility]. He will return to work/school on: 11/18/2019 Doctor's visit on 11/15/19 If there are questions or concerns please have the patient contact our office.  
 
 
 
Sincerely, 
 
 
Guerita Castro MD

## 2019-11-16 LAB
ALBUMIN SERPL-MCNC: 4.7 G/DL (ref 3.5–5.5)
ALBUMIN/GLOB SERPL: 1.5 {RATIO} (ref 1.2–2.2)
ALP SERPL-CCNC: 101 IU/L (ref 39–117)
ALT SERPL-CCNC: 39 IU/L (ref 0–44)
AST SERPL-CCNC: 37 IU/L (ref 0–40)
BILIRUB SERPL-MCNC: 0.4 MG/DL (ref 0–1.2)
BUN SERPL-MCNC: 14 MG/DL (ref 6–24)
BUN/CREAT SERPL: 13 (ref 9–20)
CALCIUM SERPL-MCNC: 10 MG/DL (ref 8.7–10.2)
CHLORIDE SERPL-SCNC: 103 MMOL/L (ref 96–106)
CHOLEST SERPL-MCNC: 161 MG/DL (ref 100–199)
CO2 SERPL-SCNC: 23 MMOL/L (ref 20–29)
CREAT SERPL-MCNC: 1.07 MG/DL (ref 0.76–1.27)
ERYTHROCYTE [DISTWIDTH] IN BLOOD BY AUTOMATED COUNT: 13.7 % (ref 12.3–15.4)
GLOBULIN SER CALC-MCNC: 3.1 G/DL (ref 1.5–4.5)
GLUCOSE SERPL-MCNC: 82 MG/DL (ref 65–99)
HCT VFR BLD AUTO: 45.4 % (ref 37.5–51)
HDLC SERPL-MCNC: 54 MG/DL
HGB BLD-MCNC: 15.1 G/DL (ref 13–17.7)
LDLC SERPL CALC-MCNC: 90 MG/DL (ref 0–99)
MCH RBC QN AUTO: 27.3 PG (ref 26.6–33)
MCHC RBC AUTO-ENTMCNC: 33.3 G/DL (ref 31.5–35.7)
MCV RBC AUTO: 82 FL (ref 79–97)
PLATELET # BLD AUTO: 312 X10E3/UL (ref 150–450)
POTASSIUM SERPL-SCNC: 4.4 MMOL/L (ref 3.5–5.2)
PROT SERPL-MCNC: 7.8 G/DL (ref 6–8.5)
RBC # BLD AUTO: 5.54 X10E6/UL (ref 4.14–5.8)
SODIUM SERPL-SCNC: 144 MMOL/L (ref 134–144)
TRIGL SERPL-MCNC: 86 MG/DL (ref 0–149)
TSH SERPL DL<=0.005 MIU/L-ACNC: 1.6 UIU/ML (ref 0.45–4.5)
VLDLC SERPL CALC-MCNC: 17 MG/DL (ref 5–40)
WBC # BLD AUTO: 8.5 X10E3/UL (ref 3.4–10.8)

## 2020-02-04 ENCOUNTER — TELEPHONE (OUTPATIENT)
Dept: INTERNAL MEDICINE CLINIC | Age: 40
End: 2020-02-04

## 2020-02-04 DIAGNOSIS — M79.672 FOOT PAIN, BILATERAL: Primary | ICD-10-CM

## 2020-02-04 DIAGNOSIS — M79.671 FOOT PAIN, BILATERAL: Primary | ICD-10-CM

## 2020-02-04 NOTE — TELEPHONE ENCOUNTER
Caller's first and last name: Trey Certain   Reason for call:  wanted to give number for foot doctor   Callback required yes/no and why: no   Best contact number(s): [phone]   Details to clarify the request: number for referral for the foot doctor. [phone].  [address]

## 2020-02-04 NOTE — TELEPHONE ENCOUNTER
#[phone] Pt states he needs an appt to get a referral for a foot specialist.  Wears shoes down on one side and has foot  Pain. Can't be seen without a referral.  Please call to schedule. Thanks.

## 2020-02-04 NOTE — TELEPHONE ENCOUNTER
Called, spoke to pt. Two identifiers confirmed. Pt stated he has a podiatrist and will call back with information so referral can be faxed. Pt verbalized understanding of information discussed w/ no further questions at this time.

## 2020-10-30 ENCOUNTER — TELEPHONE (OUTPATIENT)
Dept: INTERNAL MEDICINE CLINIC | Age: 40
End: 2020-10-30

## 2020-10-30 NOTE — TELEPHONE ENCOUNTER
Appointment Request From: Pawel Chandler     With Provider: Arabella Alex MD [facility]]     Preferred Date Range: 10/28/2020 - 11/30/2020     Preferred Times: Any Time     Reason for visit: Request an Appointment     Comments:  Annual checkup and prostate exam

## 2022-03-18 PROBLEM — J30.1 SEASONAL ALLERGIC RHINITIS DUE TO POLLEN: Status: ACTIVE | Noted: 2017-02-28

## 2022-03-18 PROBLEM — E66.01 MORBID OBESITY DUE TO EXCESS CALORIES (HCC): Status: ACTIVE | Noted: 2017-02-28

## 2023-05-20 RX ORDER — ALBUTEROL SULFATE 90 UG/1
1 AEROSOL, METERED RESPIRATORY (INHALATION) EVERY 4 HOURS PRN
COMMUNITY
Start: 2019-03-22

## 2023-05-20 RX ORDER — NYSTATIN 100000 [USP'U]/G
POWDER TOPICAL 4 TIMES DAILY
COMMUNITY
Start: 2017-02-28

## 2023-05-20 RX ORDER — CETIRIZINE HYDROCHLORIDE 10 MG/1
10 TABLET ORAL DAILY
COMMUNITY
Start: 2019-03-22

## 2023-05-20 RX ORDER — MONTELUKAST SODIUM 10 MG/1
10 TABLET ORAL DAILY
COMMUNITY
Start: 2018-10-12

## 2025-08-11 ENCOUNTER — TRANSCRIBE ORDERS (OUTPATIENT)
Facility: HOSPITAL | Age: 45
End: 2025-08-11

## 2025-08-11 DIAGNOSIS — K52.9 IBD (INFLAMMATORY BOWEL DISEASE): ICD-10-CM

## 2025-08-11 DIAGNOSIS — K63.89 OTHER SPECIFIED DISEASES OF INTESTINE: Primary | ICD-10-CM

## 2025-08-11 DIAGNOSIS — K62.1 RECTAL POLYP: ICD-10-CM

## 2025-08-27 ENCOUNTER — HOSPITAL ENCOUNTER (OUTPATIENT)
Facility: HOSPITAL | Age: 45
Discharge: HOME | End: 2025-08-30
Attending: INTERNAL MEDICINE
Payer: COMMERCIAL

## 2025-08-27 DIAGNOSIS — K52.9 IBD (INFLAMMATORY BOWEL DISEASE): ICD-10-CM

## 2025-08-27 DIAGNOSIS — K62.1 RECTAL POLYP: ICD-10-CM

## 2025-08-27 DIAGNOSIS — K63.89 OTHER SPECIFIED DISEASES OF INTESTINE: ICD-10-CM

## 2025-08-27 PROCEDURE — 6360000004 HC RX CONTRAST MEDICATION: Performed by: INTERNAL MEDICINE

## 2025-08-27 PROCEDURE — 74177 CT ABD & PELVIS W/CONTRAST: CPT

## 2025-08-27 RX ORDER — IOPAMIDOL 755 MG/ML
100 INJECTION, SOLUTION INTRAVASCULAR
Status: COMPLETED | OUTPATIENT
Start: 2025-08-27 | End: 2025-08-27

## 2025-08-27 RX ADMIN — IOPAMIDOL 100 ML: 755 INJECTION, SOLUTION INTRAVENOUS at 14:40
